# Patient Record
Sex: MALE | Race: WHITE | NOT HISPANIC OR LATINO | Employment: OTHER | URBAN - METROPOLITAN AREA
[De-identification: names, ages, dates, MRNs, and addresses within clinical notes are randomized per-mention and may not be internally consistent; named-entity substitution may affect disease eponyms.]

---

## 2019-09-30 ENCOUNTER — APPOINTMENT (EMERGENCY)
Dept: CT IMAGING | Facility: HOSPITAL | Age: 24
End: 2019-09-30

## 2019-09-30 ENCOUNTER — HOSPITAL ENCOUNTER (EMERGENCY)
Facility: HOSPITAL | Age: 24
Discharge: HOME | End: 2019-09-30
Attending: EMERGENCY MEDICINE | Admitting: EMERGENCY MEDICINE

## 2019-09-30 VITALS
RESPIRATION RATE: 18 BRPM | DIASTOLIC BLOOD PRESSURE: 65 MMHG | BODY MASS INDEX: 24.76 KG/M2 | OXYGEN SATURATION: 98 % | TEMPERATURE: 98.5 F | SYSTOLIC BLOOD PRESSURE: 135 MMHG | HEART RATE: 80 BPM | WEIGHT: 158.07 LBS

## 2019-09-30 DIAGNOSIS — R30.0 DYSURIA: ICD-10-CM

## 2019-09-30 DIAGNOSIS — R10.9 LEFT FLANK PAIN: ICD-10-CM

## 2019-09-30 DIAGNOSIS — R10.2 SUPRAPUBIC PAIN: ICD-10-CM

## 2019-09-30 DIAGNOSIS — N50.812 TESTICULAR PAIN, LEFT: Primary | ICD-10-CM

## 2019-09-30 LAB
ANION GAP SERPL CALCULATED.3IONS-SCNC: 8 MMOL/L (ref 4–13)
BASOPHILS # BLD AUTO: 0.03 THOUSANDS/ΜL (ref 0–0.1)
BASOPHILS NFR BLD AUTO: 0 % (ref 0–1)
BILIRUB UR QL STRIP: NEGATIVE
BUN SERPL-MCNC: 20 MG/DL (ref 5–25)
CALCIUM SERPL-MCNC: 9.8 MG/DL (ref 8.3–10.1)
CHLORIDE SERPL-SCNC: 103 MMOL/L (ref 100–108)
CLARITY UR: CLEAR
CLARITY, POC: CLEAR
CO2 SERPL-SCNC: 31 MMOL/L (ref 21–32)
COLOR UR: YELLOW
COLOR, POC: YELLOW
CREAT SERPL-MCNC: 1.08 MG/DL (ref 0.6–1.3)
EOSINOPHIL # BLD AUTO: 0.23 THOUSAND/ΜL (ref 0–0.61)
EOSINOPHIL NFR BLD AUTO: 3 % (ref 0–6)
ERYTHROCYTE [DISTWIDTH] IN BLOOD BY AUTOMATED COUNT: 11.2 % (ref 11.6–15.1)
GFR SERPL CREATININE-BSD FRML MDRD: 96 ML/MIN/1.73SQ M
GLUCOSE SERPL-MCNC: 94 MG/DL (ref 65–140)
GLUCOSE UR STRIP-MCNC: NEGATIVE MG/DL
HCT VFR BLD AUTO: 47.8 % (ref 36.5–49.3)
HGB BLD-MCNC: 15.6 G/DL (ref 12–17)
HGB UR QL STRIP.AUTO: NEGATIVE
IMM GRANULOCYTES # BLD AUTO: 0.04 THOUSAND/UL (ref 0–0.2)
IMM GRANULOCYTES NFR BLD AUTO: 1 % (ref 0–2)
KETONES UR STRIP-MCNC: NEGATIVE MG/DL
LEUKOCYTE ESTERASE UR QL STRIP: NEGATIVE
LYMPHOCYTES # BLD AUTO: 2.32 THOUSANDS/ΜL (ref 0.6–4.47)
LYMPHOCYTES NFR BLD AUTO: 28 % (ref 14–44)
MCH RBC QN AUTO: 28.9 PG (ref 26.8–34.3)
MCHC RBC AUTO-ENTMCNC: 32.6 G/DL (ref 31.4–37.4)
MCV RBC AUTO: 89 FL (ref 82–98)
MONOCYTES # BLD AUTO: 0.79 THOUSAND/ΜL (ref 0.17–1.22)
MONOCYTES NFR BLD AUTO: 10 % (ref 4–12)
NEUTROPHILS # BLD AUTO: 4.79 THOUSANDS/ΜL (ref 1.85–7.62)
NEUTS SEG NFR BLD AUTO: 58 % (ref 43–75)
NITRITE UR QL STRIP: NEGATIVE
NRBC BLD AUTO-RTO: 0 /100 WBCS
PH UR STRIP.AUTO: 6.5 [PH] (ref 4.5–8)
PLATELET # BLD AUTO: 257 THOUSANDS/UL (ref 149–390)
PMV BLD AUTO: 10 FL (ref 8.9–12.7)
POTASSIUM SERPL-SCNC: 3.6 MMOL/L (ref 3.5–5.3)
PROT UR STRIP-MCNC: NEGATIVE MG/DL
RBC # BLD AUTO: 5.39 MILLION/UL (ref 3.88–5.62)
SODIUM SERPL-SCNC: 142 MMOL/L (ref 136–145)
SP GR UR STRIP.AUTO: 1.02 (ref 1–1.03)
UROBILINOGEN UR QL STRIP.AUTO: 0.2 E.U./DL
WBC # BLD AUTO: 8.2 THOUSAND/UL (ref 4.31–10.16)

## 2019-09-30 PROCEDURE — 81003 URINALYSIS AUTO W/O SCOPE: CPT

## 2019-09-30 PROCEDURE — 99284 EMERGENCY DEPT VISIT MOD MDM: CPT

## 2019-09-30 PROCEDURE — 74177 CT ABD & PELVIS W/CONTRAST: CPT

## 2019-09-30 PROCEDURE — 80048 BASIC METABOLIC PNL TOTAL CA: CPT | Performed by: EMERGENCY MEDICINE

## 2019-09-30 PROCEDURE — 85025 COMPLETE CBC W/AUTO DIFF WBC: CPT | Performed by: EMERGENCY MEDICINE

## 2019-09-30 PROCEDURE — 99284 EMERGENCY DEPT VISIT MOD MDM: CPT | Performed by: EMERGENCY MEDICINE

## 2019-09-30 PROCEDURE — 36415 COLL VENOUS BLD VENIPUNCTURE: CPT | Performed by: EMERGENCY MEDICINE

## 2019-09-30 PROCEDURE — 96360 HYDRATION IV INFUSION INIT: CPT

## 2019-09-30 RX ORDER — DOXYCYCLINE HYCLATE 100 MG
100 TABLET ORAL 2 TIMES DAILY
COMMUNITY
Start: 2019-09-25 | End: 2019-10-05

## 2019-09-30 RX ADMIN — IOHEXOL 100 ML: 350 INJECTION, SOLUTION INTRAVENOUS at 20:48

## 2019-09-30 RX ADMIN — SODIUM CHLORIDE 1000 ML: 0.9 INJECTION, SOLUTION INTRAVENOUS at 20:16

## 2019-10-01 NOTE — ED PROVIDER NOTES
History  Chief Complaint   Patient presents with    Testicle Pain     Pt reports left testicle pain since tuesday  pt states he was dx with a bacterial infection in his testicles and given abx  pt states he is now having more pain in his suprapubic area and pain with urination  Pt is a 25year old male with no known PMH presenting with suprapubic pain x 6 days  Pt states he has been having intermittent sharp left testicle, groin, suprapubic and left flank pain  Pt states he has pain with urination, which exacerbated his suprapubic pain  Associated nausea but denies vomiting  He was seen by PCP who started pt on doxycycline which has not improved his symptoms  He does not currently have pain  No swelling or erythema of the testicle  No penile discharge  Denies fevers  Tested negative for GC/chlamydia or UTI  Prior to Admission Medications   Prescriptions Last Dose Informant Patient Reported? Taking?   doxycycline hyclate (VIBRA-TABS) 100 mg tablet   Yes Yes   Sig: Take 100 mg by mouth 2 (two) times a day      Facility-Administered Medications: None       Past Medical History:   Diagnosis Date    No known problems        Past Surgical History:   Procedure Laterality Date    EYE SURGERY         History reviewed  No pertinent family history  I have reviewed and agree with the history as documented  Social History     Tobacco Use    Smoking status: Current Some Day Smoker     Types: E-Cigarettes    Smokeless tobacco: Never Used   Substance Use Topics    Alcohol use: Yes     Comment: "a lot"    Drug use: No        Review of Systems   Constitutional: Negative for chills, diaphoresis and fever  Respiratory: Negative for shortness of breath  Cardiovascular: Negative for chest pain  Gastrointestinal: Positive for abdominal pain and nausea  Negative for blood in stool, constipation, diarrhea and vomiting  Genitourinary: Positive for dysuria, flank pain and testicular pain   Negative for decreased urine volume, difficulty urinating, discharge, frequency, hematuria, penile pain, penile swelling, scrotal swelling and urgency  Musculoskeletal: Negative for back pain  Neurological: Negative for dizziness and light-headedness  Physical Exam  Physical Exam   Constitutional: He is oriented to person, place, and time  He appears well-developed and well-nourished  No distress  HENT:   Head: Normocephalic and atraumatic  Eyes: Conjunctivae and EOM are normal    Neck: Normal range of motion  Neck supple  Cardiovascular: Normal rate, regular rhythm, normal heart sounds and intact distal pulses  Pulmonary/Chest: Effort normal and breath sounds normal    Abdominal: Soft  Bowel sounds are normal  He exhibits no distension  There is tenderness in the suprapubic area  There is rigidity  There is no rebound, no guarding and no CVA tenderness  Genitourinary: Testes normal and penis normal  Cremasteric reflex is present  Right testis shows no swelling and no tenderness  Cremasteric reflex is not absent on the right side  Left testis shows no swelling and no tenderness  Cremasteric reflex is not absent on the left side  Musculoskeletal: Normal range of motion  Neurological: He is alert and oriented to person, place, and time  Skin: Skin is warm and dry  He is not diaphoretic         Vital Signs  ED Triage Vitals [09/30/19 1831]   Temperature Pulse Respirations Blood Pressure SpO2   98 5 °F (36 9 °C) 84 18 159/72 97 %      Temp Source Heart Rate Source Patient Position - Orthostatic VS BP Location FiO2 (%)   Temporal Monitor Sitting Right arm --      Pain Score       --           Vitals:    09/30/19 1831 09/30/19 2048   BP: 159/72 135/65   Pulse: 84 80   Patient Position - Orthostatic VS: Sitting Lying         Visual Acuity      ED Medications  Medications   sodium chloride 0 9 % bolus 1,000 mL (0 mL Intravenous Stopped 9/30/19 2130)   iohexol (OMNIPAQUE) 350 MG/ML injection (MULTI-DOSE) 100 mL (100 mL Intravenous Given 9/30/19 2048)       Diagnostic Studies  Results Reviewed     Procedure Component Value Units Date/Time    Basic metabolic panel [[id number]] Collected:  09/30/19 2015    Lab Status:  Final result Specimen:  Blood from Arm, Right Updated:  09/30/19 2034     Sodium 142 mmol/L      Potassium 3 6 mmol/L      Chloride 103 mmol/L      CO2 31 mmol/L      ANION GAP 8 mmol/L      BUN 20 mg/dL      Creatinine 1 08 mg/dL      Glucose 94 mg/dL      Calcium 9 8 mg/dL      eGFR 96 ml/min/1 73sq m     Narrative:       National Kid[employer] Foundation guidelines for Chronic Kidney Disease (CKD):     Stage 1 with normal or high GFR (GFR > 90 mL/min/1 73 square meters)    Stage 2 Mild CKD (GFR = 60-89 mL/min/1 73 square meters)    Stage 3A Moderate CKD (GFR = 45-59 mL/min/1 73 square meters)    Stage 3B Moderate CKD (GFR = 30-44 mL/min/1 73 square meters)    Stage 4 Severe CKD (GFR = 15-29 mL/min/1 73 square meters)    Stage 5 End Stage CKD (GFR <15 mL/min/1 73 square meters)  Note: GFR calculation is accurate only with a steady state creatinine    CBC and differential [[id number]]  (Abnormal) Collected:  09/30/19 2015    Lab Status:  Final result Specimen:  Blood from Arm, Right Updated:  09/30/19 2026     WBC 8 20 Thousand/uL      RBC 5 39 Million/uL      Hemoglobin 15 6 g/dL      Hematocrit 47 8 %      MCV 89 fL      MCH 28 9 pg      MCHC 32 6 g/dL      RDW 11 2 %      MPV 10 0 fL      Platelets 443 Thousands/uL      nRBC 0 /100 WBCs      Neutrophils Relative 58 %      Immat GRANS % 1 %      Lymphocytes Relative 28 %      Monocytes Relative 10 %      Eosinophils Relative 3 %      Basophils Relative 0 %      Neutrophils Absolute 4 79 Thousands/µL      Immature Grans Absolute 0 04 Thousand/uL      Lymphocytes Absolute 2 32 Thousands/µL      Monocytes Absolute 0 79 Thousand/µL      Eosinophils Absolute 0 23 Thousand/µL      Basophils Absolute 0 03 Thousands/µL     POCT urinalysis dipstick [[id number]] (Normal) Resulted:  09/30/19 1945    Lab Status:  Final result Updated:  09/30/19 1946     Color, UA yellow     Clarity, UA clear    ED Urine Macroscopic [[id number]] Collected:  09/30/19 1943    Lab Status:  Final result Specimen:  Urine Updated:  09/30/19 1944     Color, UA Yellow     Clarity, UA Clear     pH, UA 6 5     Leukocytes, UA Negative     Nitrite, UA Negative     Protein, UA Negative mg/dl      Glucose, UA Negative mg/dl      Ketones, UA Negative mg/dl      Urobilinogen, UA 0 2 E U /dl      Bilirubin, UA Negative     Blood, UA Negative     Specific Gravity, UA 1 020    Narrative:       [facility] RESULT                 CT abdomen pelvis with contrast   Final Result by Yoni Gleason MD (09/30 2109)      No acute CT findings  Workstation performed: [id number]                    Procedures  Procedures       ED Course  ED Course as of Sep 30 2134   Mon Sep 30, 2019   2129 Patient presenting with left testicular pain, suprapubic pain and left flank pain with nausea that has been intermittent for 6 days  He was seen by PCP and tested negative for Gc/chlamydia and UTI  He was started on doxycyline which has not improved symptoms  He has no signs or symptoms of testicular torsion, with benign physical exam  I do not feel he needs an ultrasound at this point  CT negative for acute findings  Will continue doxycycline and have pt follow up with urology if things do not improve  Educated on return precautions  Stable for discharge                                     MDM    Disposition  Final diagnoses:   Testicular pain, left   Suprapubic pain   Dysuria   Left flank pain     Time reflects when diagnosis was documented in both MDM as applicable and the Disposition within this note     Time User Action Codes Description Comment    9/30/2019  9:26 PM Ramon Mane Add [N50 812] Testicular pain, left     9/30/2019  9:26 PM Sun Dial B Add [R10 2] Suprapubic pain     9/30/2019  9:26 PM Sun Dial B Add [R30 0] Dysuria     9/30/2019  9:26 PM Sun REYNA Add [R10 9] Left flank pain       ED Disposition     ED Disposition Condition Date/Time Comment    Discharge Good Mon Sep 30, 2019  9:27 PM Nicolás Gold discharge to home/self care  Follow-up Information     Follow up With Specialties Details Why Contact Info Additional Information    [facility] Schedule an appointment as soon as possible for a visit  As needed [id number]  [address] [phone] [address] [address], [address] [facility], [place], [place], [id number] [phone]    [facility] Schedule an appointment as soon as possible for a visit  As needed [place] [id number]  [address]  [facility], [address], [place], [place], [address] [phone]          Discharge Medication List as of 9/30/2019  9:27 PM      CONTINUE these medications which have NOT CHANGED    Details   doxycycline hyclate (VIBRA-TABS) 100 mg tablet Take 100 mg by mouth 2 (two) times a day, Starting Wed 9/25/2019, Until Sat 10/5/2019, Historical Med           No discharge procedures on file      ED Provider  Electronically Signed by           Anisa Tony PA-C  09/30/19 4522

## 2020-02-13 ENCOUNTER — RX ONLY (RX ONLY)
Age: 25
End: 2020-02-13

## 2020-02-13 ENCOUNTER — DOCTOR'S OFFICE (OUTPATIENT)
Dept: URBAN - METROPOLITAN AREA CLINIC 136 | Facility: CLINIC | Age: 25
Setting detail: OPHTHALMOLOGY
End: 2020-02-13
Payer: MEDICAID

## 2020-02-13 DIAGNOSIS — H01.002: ICD-10-CM

## 2020-02-13 DIAGNOSIS — H01.005: ICD-10-CM

## 2020-02-13 DIAGNOSIS — H02.431: ICD-10-CM

## 2020-02-13 DIAGNOSIS — H01.001: ICD-10-CM

## 2020-02-13 DIAGNOSIS — H01.004: ICD-10-CM

## 2020-02-13 DIAGNOSIS — H10.13: ICD-10-CM

## 2020-02-13 PROCEDURE — 92002 INTRM OPH EXAM NEW PATIENT: CPT | Performed by: OPHTHALMOLOGY

## 2020-02-13 PROCEDURE — 92285 EXTERNAL OCULAR PHOTOGRAPHY: CPT | Performed by: OPHTHALMOLOGY

## 2020-02-13 ASSESSMENT — REFRACTION_CURRENTRX
OS_CYLINDER: -0.50
OD_OVR_VA: 20/
OD_CYLINDER: -0.75
OS_AXIS: 75
OD_VPRISM_DIRECTION: SV
OS_SPHERE: -2.00
OS_OVR_VA: 20/
OD_AXIS: 171
OS_VPRISM_DIRECTION: SV
OD_SPHERE: -2.00

## 2020-02-13 ASSESSMENT — LID EXAM ASSESSMENTS
OS_BLEPHARITIS: LLL LUL 1+
OD_BLEPHARITIS: RLL RUL 1+

## 2020-02-13 ASSESSMENT — VISUAL ACUITY
OS_BCVA: 20/20
OD_BCVA: 20/40

## 2020-02-13 ASSESSMENT — LID POSITION - PTOSIS: OD_PTOSIS: RUL T

## 2020-02-13 ASSESSMENT — CONFRONTATIONAL VISUAL FIELD TEST (CVF)
OS_FINDINGS: FULL
OD_FINDINGS: FULL

## 2020-03-12 ENCOUNTER — RX ONLY (RX ONLY)
Age: 25
End: 2020-03-12

## 2020-03-12 ENCOUNTER — DOCTOR'S OFFICE (OUTPATIENT)
Dept: URBAN - METROPOLITAN AREA CLINIC 136 | Facility: CLINIC | Age: 25
Setting detail: OPHTHALMOLOGY
End: 2020-03-12
Payer: MEDICAID

## 2020-03-12 DIAGNOSIS — H01.002: ICD-10-CM

## 2020-03-12 DIAGNOSIS — H01.005: ICD-10-CM

## 2020-03-12 DIAGNOSIS — H01.001: ICD-10-CM

## 2020-03-12 DIAGNOSIS — L20.9: ICD-10-CM

## 2020-03-12 DIAGNOSIS — H02.431: ICD-10-CM

## 2020-03-12 DIAGNOSIS — H01.004: ICD-10-CM

## 2020-03-12 DIAGNOSIS — H10.13: ICD-10-CM

## 2020-03-12 PROCEDURE — 92012 INTRM OPH EXAM EST PATIENT: CPT | Performed by: OPHTHALMOLOGY

## 2020-03-12 ASSESSMENT — REFRACTION_CURRENTRX
OD_AXIS: 171
OS_CYLINDER: -0.50
OS_AXIS: 75
OD_OVR_VA: 20/
OD_VPRISM_DIRECTION: SV
OS_SPHERE: -2.00
OS_VPRISM_DIRECTION: SV
OD_SPHERE: -2.00
OD_CYLINDER: -0.75
OS_OVR_VA: 20/

## 2020-03-12 ASSESSMENT — LID EXAM ASSESSMENTS
OD_BLEPHARITIS: RLL RUL 1+
OS_BLEPHARITIS: LLL LUL 1+

## 2020-03-12 ASSESSMENT — LID POSITION - PTOSIS: OD_PTOSIS: RUL T

## 2020-03-12 ASSESSMENT — VISUAL ACUITY
OS_BCVA: 20/20
OD_BCVA: 20/30-1